# Patient Record
Sex: FEMALE | Race: WHITE | NOT HISPANIC OR LATINO | Employment: UNEMPLOYED | URBAN - NONMETROPOLITAN AREA
[De-identification: names, ages, dates, MRNs, and addresses within clinical notes are randomized per-mention and may not be internally consistent; named-entity substitution may affect disease eponyms.]

---

## 2022-04-22 ENCOUNTER — TRANSCRIBE ORDERS (OUTPATIENT)
Dept: ADMINISTRATIVE | Facility: HOSPITAL | Age: 37
End: 2022-04-22

## 2022-04-22 DIAGNOSIS — R10.84 GENERALIZED ABDOMINAL PAIN: Primary | ICD-10-CM

## 2022-05-13 ENCOUNTER — HOSPITAL ENCOUNTER (OUTPATIENT)
Dept: ULTRASOUND IMAGING | Facility: HOSPITAL | Age: 37
Discharge: HOME | End: 2022-05-13
Admitting: NURSE PRACTITIONER

## 2022-05-13 DIAGNOSIS — R10.84 GENERALIZED ABDOMINAL PAIN: ICD-10-CM

## 2022-05-13 PROCEDURE — 76700 US EXAM ABDOM COMPLETE: CPT

## 2025-07-15 ENCOUNTER — OFFICE VISIT (OUTPATIENT)
Dept: PULMONOLOGY | Facility: CLINIC | Age: 40
End: 2025-07-15
Payer: COMMERCIAL

## 2025-07-15 VITALS
HEIGHT: 62 IN | RESPIRATION RATE: 18 BRPM | HEART RATE: 77 BPM | OXYGEN SATURATION: 98 % | SYSTOLIC BLOOD PRESSURE: 124 MMHG | BODY MASS INDEX: 24.99 KG/M2 | DIASTOLIC BLOOD PRESSURE: 72 MMHG | WEIGHT: 135.8 LBS

## 2025-07-15 DIAGNOSIS — G47.19 EXCESSIVE DAYTIME SLEEPINESS: ICD-10-CM

## 2025-07-15 DIAGNOSIS — R06.83 SNORING: Primary | ICD-10-CM

## 2025-07-15 PROCEDURE — 99204 OFFICE O/P NEW MOD 45 MIN: CPT | Performed by: INTERNAL MEDICINE

## 2025-07-15 RX ORDER — SPIRONOLACTONE 50 MG/1
TABLET, FILM COATED ORAL
COMMUNITY

## 2025-07-15 RX ORDER — BUPROPION HYDROCHLORIDE 300 MG/1
TABLET ORAL
COMMUNITY

## 2025-07-15 NOTE — PROGRESS NOTES
"  CONSULT NOTE    Requested by:   Alma Vasquez, *   Alma Vasquez, APRN      Chief Complaint   Patient presents with    Sleeping Problem    Consult       Subjective:  Maryana Crawford is a 39 y.o. female.   Patient came in today for evaluation of possible sleep apnea. Patient says that for the past few years she snores loudly     she has woken up in the middle of the night gasping for breath and sometimes with a choking sensation. Also, the patient's family notes that she has occasional pauses in the breathing.     she feels that she doesn't get restful night sleep and her quality has diminished considerably. she does feel sleepy watching TV and reading a book.      she is complaining of occasional headaches.     Patient's sleep schedule was reviewed. she goes to bed around 10 PM and wakes up in the morning around 6 AM.     There is no known family history of sleep apnea    her Epsworth Sleepiness score was 7 /24.     she drinks 4 cups/cans of caffeinated drinks per day.      The following portions of the patient's history were reviewed and updated as appropriate: allergies, current medications, past family history, past medical history, past social history, and past surgical history.    Review of Systems   HENT:  Negative for sinus pressure, sneezing and sore throat.    Respiratory:  Negative for cough, chest tightness, shortness of breath and wheezing.    Psychiatric/Behavioral:  Positive for sleep disturbance.    All other systems reviewed and are negative.      No past medical history on file.    Social History     Tobacco Use    Smoking status: Never    Smokeless tobacco: Never   Substance Use Topics    Alcohol use: Not on file         Objective:  Visit Vitals  /72   Pulse 77   Resp 18   Ht 157.5 cm (62\")   Wt 61.6 kg (135 lb 12.8 oz)   SpO2 98%   BMI 24.84 kg/m²       BMI Readings from Last 8 Encounters:   07/15/25 24.84 kg/m²       Physical Exam  Vitals reviewed.   Constitutional:       " Appearance: She is well-developed.   HENT:      Head: Atraumatic.      Nose:      Comments: Nasal erythema noted.     Mouth/Throat:      Comments: Oropharynx was crowded.  Neurological:      Mental Status: She is alert and oriented to person, place, and time.           Assessment/Plan:  Diagnoses and all orders for this visit:    1. Snoring (Primary)  -     Home Sleep Study; Future    2. Excessive daytime sleepiness  -     Home Sleep Study; Future        Return in about 5 months (around 12/15/2025) for [facility].    DISCUSSION(if any):  Laboratory workup also showed   Lab Results   Component Value Date    HGB 12.6 02/27/2024    HGB 12.9 03/08/2023    HGB 13.3 12/21/2022   ,   Lab Results   Component Value Date    HCT 37.1 02/27/2024    HCT 38.9 03/08/2023    HCT 40.8 12/21/2022     Laboratory workup also showed   Lab Results   Component Value Date    HGBA1C 4.5 02/27/2024    HGBA1C 4.8 12/21/2022     ===========================  ===========================    Sleep questionnaire was reviewed with the patient    The pathophysiology of sleep apnea was discussed, with the patient.     We will encourage her to schedule the sleep study soon.     The patient was made aware of the limitation of the home sleep study, whereby it may underestimate the true AHI and also carries a low sensitivity.  I have informed her that even if the home sleep study is negative, we may suggest an in lab sleep study to completely and definitively rule out/in sleep apnea.  The patient has understood.  This was communicated to the patient, in case home study is to be requested.    The patient is agreeable to try CPAP/BiPAP, if needed.     Patient was educated on good sleep hygiene measures and voiced understanding of the same.     Patient was given reading material regarding sleep apnea      Dictated utilizing Dragon dictation.    This document was electronically signed by Brunilda Camilo MD on 07/15/25 at 14:04 EDT

## 2025-07-17 ENCOUNTER — PATIENT ROUNDING (BHMG ONLY) (OUTPATIENT)
Dept: PULMONOLOGY | Facility: CLINIC | Age: 40
End: 2025-07-17
Payer: COMMERCIAL

## 2025-07-30 ENCOUNTER — HOSPITAL ENCOUNTER (OUTPATIENT)
Dept: SLEEP MEDICINE | Facility: HOSPITAL | Age: 40
Discharge: HOME | End: 2025-07-30
Admitting: INTERNAL MEDICINE
Payer: COMMERCIAL

## 2025-07-30 DIAGNOSIS — G47.19 EXCESSIVE DAYTIME SLEEPINESS: ICD-10-CM

## 2025-07-30 DIAGNOSIS — R06.83 SNORING: ICD-10-CM

## 2025-07-30 PROCEDURE — G0399 HOME SLEEP TEST/TYPE 3 PORTA: HCPCS

## 2025-08-01 ENCOUNTER — TELEPHONE (OUTPATIENT)
Dept: PULMONOLOGY | Facility: CLINIC | Age: 40
End: 2025-08-01
Payer: COMMERCIAL